# Patient Record
Sex: MALE | Race: WHITE | NOT HISPANIC OR LATINO | URBAN - METROPOLITAN AREA
[De-identification: names, ages, dates, MRNs, and addresses within clinical notes are randomized per-mention and may not be internally consistent; named-entity substitution may affect disease eponyms.]

---

## 2021-12-26 ENCOUNTER — APPOINTMENT (OUTPATIENT)
Dept: RADIOLOGY | Facility: CLINIC | Age: 26
End: 2021-12-26
Payer: COMMERCIAL

## 2021-12-26 ENCOUNTER — OFFICE VISIT (OUTPATIENT)
Dept: URGENT CARE | Facility: CLINIC | Age: 26
End: 2021-12-26
Payer: COMMERCIAL

## 2021-12-26 VITALS
BODY MASS INDEX: 41.75 KG/M2 | DIASTOLIC BLOOD PRESSURE: 111 MMHG | HEIGHT: 73 IN | HEART RATE: 99 BPM | WEIGHT: 315 LBS | RESPIRATION RATE: 20 BRPM | SYSTOLIC BLOOD PRESSURE: 176 MMHG | OXYGEN SATURATION: 100 %

## 2021-12-26 DIAGNOSIS — G89.29 WRIST PAIN, CHRONIC, RIGHT: ICD-10-CM

## 2021-12-26 DIAGNOSIS — M25.531 WRIST PAIN, CHRONIC, RIGHT: ICD-10-CM

## 2021-12-26 DIAGNOSIS — S69.92XA INJURY OF LEFT WRIST, INITIAL ENCOUNTER: Primary | ICD-10-CM

## 2021-12-26 PROCEDURE — 99213 OFFICE O/P EST LOW 20 MIN: CPT | Performed by: PHYSICIAN ASSISTANT

## 2021-12-26 PROCEDURE — 73110 X-RAY EXAM OF WRIST: CPT

## 2022-10-14 ENCOUNTER — OFFICE VISIT (OUTPATIENT)
Dept: URGENT CARE | Facility: CLINIC | Age: 27
End: 2022-10-14
Payer: COMMERCIAL

## 2022-10-14 VITALS — RESPIRATION RATE: 16 BRPM | HEART RATE: 113 BPM | OXYGEN SATURATION: 99 % | TEMPERATURE: 97.6 F

## 2022-10-14 DIAGNOSIS — J06.9 ACUTE URI: Primary | ICD-10-CM

## 2022-10-14 PROCEDURE — 99213 OFFICE O/P EST LOW 20 MIN: CPT | Performed by: NURSE PRACTITIONER

## 2022-10-14 RX ORDER — LOSARTAN POTASSIUM 50 MG/1
TABLET ORAL
COMMUNITY
Start: 2022-10-12

## 2022-10-14 RX ORDER — FEXOFENADINE HCL 180 MG/1
180 TABLET ORAL DAILY
COMMUNITY

## 2022-10-14 RX ORDER — PEN NEEDLE, DIABETIC 32GX 5/32"
NEEDLE, DISPOSABLE MISCELLANEOUS
COMMUNITY
Start: 2022-07-26

## 2022-10-14 RX ORDER — LIRAGLUTIDE 6 MG/ML
INJECTION, SOLUTION SUBCUTANEOUS
COMMUNITY
Start: 2022-10-04

## 2022-10-14 NOTE — PATIENT INSTRUCTIONS
Continue Allegra daily  Flonase daily as directed  Robitussin, delsym or Mucinex for cough  Increase fluid intake   Tylenol/Motrin as needed for pain or fever  Rest  Follow up with your PCP for worsening symptoms      Upper Respiratory Infection   WHAT YOU NEED TO KNOW:   An upper respiratory infection is also called a cold  It can affect your nose, throat, ears, and sinuses  Cold symptoms are usually worst for the first 3 to 5 days  Most people get better in 7 to 14 days  You may continue to cough for 2 to 3 weeks  Colds are caused by viruses and do not get better with antibiotics  DISCHARGE INSTRUCTIONS:   Call your local emergency number (911 in the [address] [place] [address],[address]) if:   You have chest pain or trouble breathing  Return to the emergency department if:   You have a fever over 102ºF (39ºC)  Call your doctor if:   You have a low fever  Your sore throat gets worse or you see white or yellow spots in your throat  Your symptoms get worse after 3 to 5 days or are not better in 14 days  You have a rash anywhere on your skin  You have large, tender lumps in your neck  You have thick, green, or yellow drainage from your nose  You cough up thick yellow, green, or bloody mucus  You have a bad earache  You have questions or concerns about your condition or care  Medicines: You may need any of the following:  Decongestants  help reduce nasal congestion and help you breathe more easily  If you take decongestant pills, they may make you feel restless or cause problems with your sleep  Do not use decongestant sprays for more than a few days  Cough suppressants  help reduce coughing  Ask your healthcare provider which type of cough medicine is best for you  NSAIDs , such as ibuprofen, help decrease swelling, pain, and fever  NSAIDs can cause stomach bleeding or kidney problems in certain people  If you take blood thinner medicine, always ask your healthcare provider if NSAIDs are safe for you  Always read the medicine label and follow directions  Acetaminophen  decreases pain and fever  It is available without a doctor's order  Ask how much to take and how often to take it  Follow directions  Read the labels of all other medicines you are using to see if they also contain acetaminophen, or ask your doctor or pharmacist  Acetaminophen can cause liver damage if not taken correctly  Do not use more than 4 grams (4,000 milligrams) total of acetaminophen in one day  Take your medicine as directed  Contact your healthcare provider if you think your medicine is not helping or if you have side effects  Tell him or her if you are allergic to any medicine  Keep a list of the medicines, vitamins, and herbs you take  Include the amounts, and when and why you take them  Bring the list or the pill bottles to follow-up visits  Carry your medicine list with you in case of an emergency  Self-care:   Rest as much as possible  Slowly start to do more each day  Drink more liquids as directed  Liquids will help thin and loosen mucus so you can cough it up  Liquids will also help prevent dehydration  Liquids that help prevent dehydration include water, fruit juice, and broth  Do not drink liquids that contain caffeine  Caffeine can increase your risk for dehydration  Ask your healthcare provider how much liquid to drink each day  Soothe a sore throat  Gargle with warm salt water  Make salt water by dissolving ¼ teaspoon salt in 1 cup warm water  You may also suck on hard candy or throat lozenges  You may use a sore throat spray  Use a humidifier or vaporizer  Use a cool mist humidifier or a vaporizer to increase air moisture in your home  This may make it easier for you to breathe and help decrease your cough  Use saline nasal drops as directed  These help relieve congestion  Apply petroleum-based jelly around the outside of your nostrils    This can decrease irritation from blowing your nose     Do not smoke  Nicotine and other chemicals in cigarettes and cigars can make your symptoms worse  They can also cause infections such as bronchitis or pneumonia  Ask your healthcare provider for information if you currently smoke and need help to quit  E-cigarettes or smokeless tobacco still contain nicotine  Talk to your healthcare provider before you use these products  Prevent a cold: Wash your hands often  Use soap and water every time you wash your hands  Rub your soapy hands together, lacing your fingers  Use the fingers of one hand to scrub under the nails of the other hand  Wash for at least 20 seconds  Rinse with warm, running water for several seconds  Then dry your hands  Use germ-killing gel if soap and water are not available  Do not touch your eyes or mouth without washing your hands first          Cover a sneeze or cough  Use a tissue that covers your mouth and nose  Put the used tissue in the trash right away  Use the bend of your arm if a tissue is not available  Wash your hands well with soap and water or use a hand   Do not stand close to anyone who is sneezing or coughing  Try to stay away from others while you are sick  This is especially important during the first 2 to 3 days when the virus is more easily spread  Wait until a fever, cough, or other symptoms are gone before you return to work or other regular activities  Do not share items while you are sick  This includes food, drinks, eating utensils, and dishes  Follow up with your doctor as directed:  Write down your questions so you remember to ask them during your visits  © Copyright [employer] 2022 Information is for End User's use only and may not be sold, redistributed or otherwise used for commercial purposes  All illustrations and images included in CareNotes® are the copyrighted property of [employer]  or Patricia Toussaint  The above information is an  only   It is not intended as medical advice for individual conditions or treatments  Talk to your doctor, nurse or pharmacist before following any medical regimen to see if it is safe and effective for you

## 2022-10-14 NOTE — PROGRESS NOTES
[facility]        NAME: Wilda Moy is a 32 y o  male  : 1995    MRN: [id number]  DATE: 2022  TIME: 11:51 AM    Assessment and Plan   Acute URI [J06 9]  1  Acute URI           Patient Instructions   Continue Allegra daily  Flonase daily as directed  Robitussin, delsym or Mucinex for cough  Increase fluid intake   Tylenol/Motrin as needed for pain or fever  Rest  Follow up with your PCP for worsening symptoms    Follow up with PCP in 3-5 days  Proceed to  ER if symptoms worsen  Chief Complaint     Chief Complaint   Patient presents with   • Cold Like Symptoms     Started 2 days ago  Sore throat ear pain, cough         History of Present Illness       Patient is a 32year old male presenting with 2 days of sore throat, cough, and bilateral ear pressure  Cough is productive with brown to yellow mucous  Denies fever or chills  He is a non smoker  He had 2 negative home covid tests  Reports chronic environmental allergies  He had been taking dayquil, nyquil, and allerga  Review of Systems   Review of Systems   Constitutional: Negative for activity change, chills and fever  HENT: Positive for ear pain (b/l ear pressure), postnasal drip and sore throat  Negative for congestion  Respiratory: Positive for cough  Skin: Negative for rash           Current Medications       Current Outpatient Medications:   •  [employer] Unifine Pentips Plus 32G X 4 MM MISC, , Disp: , Rfl:   •  fexofenadine (ALLEGRA) 180 MG tablet, Take 180 mg by mouth daily, Disp: , Rfl:   •  losartan (COZAAR) 50 mg tablet, , Disp: , Rfl:   •  Saxenda injection, , Disp: , Rfl:     Current Allergies     Allergies as of 10/14/2022   • (No Known Allergies)            The following portions of the patient's history were reviewed and updated as appropriate: allergies, current medications, past family history, past medical history, past social history, past surgical history and problem list      No past medical history on file  Past Surgical History:   Procedure Laterality Date   • ADENOIDECTOMY     • TONSILLECTOMY         Family History   Problem Relation Age of Onset   • Bipolar disorder Mother    • Prostate cancer Father          Medications have been verified  Objective   Pulse (!) 113   Temp 97 6 °F (36 4 °C) (Temporal)   Resp 16   SpO2 99%     Physical Exam     Physical Exam  Vitals reviewed  Constitutional:       General: He is awake  He is not in acute distress  Appearance: Normal appearance  HENT:      Head: Normocephalic  Right Ear: Hearing, ear canal and external ear normal  A middle ear effusion is present  Left Ear: Hearing, ear canal and external ear normal  A middle ear effusion is present  Nose: Nose normal       Mouth/Throat:      Lips: Pink  Pharynx: Oropharynx is clear  Cardiovascular:      Rate and Rhythm: Regular rhythm  Tachycardia present  Heart sounds: Normal heart sounds, S1 normal and S2 normal    Pulmonary:      Effort: Pulmonary effort is normal       Breath sounds: Normal breath sounds  No decreased breath sounds, wheezing, rhonchi or rales  Skin:     General: Skin is warm and moist    Neurological:      General: No focal deficit present  Mental Status: He is alert and oriented to person, place, and time  Psychiatric:         Behavior: Behavior is cooperative

## 2022-12-19 ENCOUNTER — OFFICE VISIT (OUTPATIENT)
Dept: URGENT CARE | Facility: CLINIC | Age: 27
End: 2022-12-19

## 2022-12-19 VITALS
SYSTOLIC BLOOD PRESSURE: 145 MMHG | DIASTOLIC BLOOD PRESSURE: 70 MMHG | TEMPERATURE: 97.5 F | HEART RATE: 121 BPM | RESPIRATION RATE: 24 BRPM | OXYGEN SATURATION: 98 %

## 2022-12-19 DIAGNOSIS — R07.89 CHEST WALL PAIN: Primary | ICD-10-CM

## 2022-12-19 NOTE — PROGRESS NOTES
[facility]        NAME: Chuy Lyon is a 32 y o  male  : 1995    MRN: [id number]  DATE: 2022  TIME: 9:26 AM    Assessment and Plan   Chest wall pain [R07 89]  1  Chest wall pain  ECG 12 lead            Patient Instructions        Follow up with PCP in 3-5 days  Proceed to  ER if symptoms worsen  Chief Complaint     Chief Complaint   Patient presents with   • Chest Pain     States he started with chest pain last night on L side  No pain with shallow breathing, only with deep breaths  Also no has dry cough  Took Pepto Bismol         History of Present Illness       Patient is a 32year old male presenting with cough that started last night  He had some blood tinged sputum after coughing  He also reports left upper chest wall discomfort that is reproducible with palpation and movement  He had a history of frequent GERD  He took Pepto tablet last night with improvement  Denies fever, chills, myalgias, abdominal pain, sore throat, or ear pain  Review of Systems   Review of Systems   Constitutional: Negative for activity change, chills, fatigue and fever  HENT: Negative for congestion, ear pain and sore throat  Respiratory: Positive for cough  Negative for shortness of breath  Cardiovascular: Positive for chest pain  Gastrointestinal: Positive for nausea  Negative for abdominal pain and vomiting  Neurological: Negative for headaches           Current Medications       Current Outpatient Medications:   •  [employer]One Unifine Pentips Plus 32G X 4 MM MISC, , Disp: , Rfl:   •  fexofenadine (ALLEGRA) 180 MG tablet, Take 180 mg by mouth daily, Disp: , Rfl:   •  losartan (COZAAR) 50 mg tablet, , Disp: , Rfl:   •  Saxenda injection, , Disp: , Rfl:     Current Allergies     Allergies as of 2022   • (No Known Allergies)            The following portions of the patient's history were reviewed and updated as appropriate: allergies, current medications, past family history, past medical history, past social history, past surgical history and problem list      No past medical history on file  Past Surgical History:   Procedure Laterality Date   • ADENOIDECTOMY     • TONSILLECTOMY         Family History   Problem Relation Age of Onset   • Bipolar disorder Mother    • Prostate cancer Father          Medications have been verified  Objective   /70   Pulse (!) 121   Temp 97 5 °F (36 4 °C) (Temporal)   Resp (!) 24   SpO2 98%        Physical Exam     Physical Exam  Vitals reviewed  Constitutional:       General: He is awake  He is not in acute distress  Appearance: He is well-developed  He is obese  HENT:      Head: Normocephalic  Right Ear: Hearing normal       Left Ear: Hearing normal       Nose: Nose normal    Cardiovascular:      Rate and Rhythm: Regular rhythm  Tachycardia present  Heart sounds: Normal heart sounds, S1 normal and S2 normal    Pulmonary:      Effort: Pulmonary effort is normal       Breath sounds: Normal breath sounds  No decreased breath sounds, wheezing, rhonchi or rales  Chest:      Chest wall: Tenderness present  Skin:     General: Skin is warm and moist    Neurological:      General: No focal deficit present  Mental Status: He is alert and oriented to person, place, and time  Psychiatric:         Behavior: Behavior is cooperative          EK  Rate 118  Sinus tachycardia  Normal OR, QRS, and ST segments

## 2022-12-29 LAB
ATRIAL RATE: 118 BPM
P AXIS: 16 DEGREES
PR INTERVAL: 124 MS
QRS AXIS: 43 DEGREES
QRSD INTERVAL: 90 MS
QT INTERVAL: 320 MS
QTC INTERVAL: 448 MS
T WAVE AXIS: 28 DEGREES
VENTRICULAR RATE: 118 BPM

## 2023-05-07 ENCOUNTER — OFFICE VISIT (OUTPATIENT)
Dept: URGENT CARE | Facility: CLINIC | Age: 28
End: 2023-05-07

## 2023-05-07 VITALS
HEART RATE: 96 BPM | TEMPERATURE: 97.6 F | BODY MASS INDEX: 42.66 KG/M2 | RESPIRATION RATE: 16 BRPM | WEIGHT: 315 LBS | OXYGEN SATURATION: 97 % | HEIGHT: 72 IN

## 2023-05-07 DIAGNOSIS — K14.6 SORENESS OF TONGUE: ICD-10-CM

## 2023-05-07 DIAGNOSIS — J02.9 SORE THROAT: Primary | ICD-10-CM

## 2023-05-07 DIAGNOSIS — K12.0 APHTHOUS STOMATITIS: ICD-10-CM

## 2023-05-07 LAB — S PYO AG THROAT QL: NEGATIVE

## 2023-05-07 RX ORDER — TRIAMCINOLONE ACETONIDE 0.1 %
1 PASTE (GRAM) DENTAL 2 TIMES DAILY
Qty: 5 G | Refills: 0 | Status: SHIPPED | OUTPATIENT
Start: 2023-05-07

## 2023-05-07 NOTE — PATIENT INSTRUCTIONS
--Rapid strep test negative  --Warm water and baking soda rinses three times a day  --Rx oral paste applied sparingly to affected area  --Follow-up with dentist and/or PCP for ongoing/worsening symptoms over the next 3-7 days

## 2023-05-07 NOTE — PROGRESS NOTES
[facility]        NAME: Lisseth Douglas is a 25 y o  male  : 1995    MRN: [id number]  DATE: May 7, 2023  TIME: 10:37 AM    Assessment and Plan   Sore throat [J02 9]  1  Sore throat  POCT rapid strepA      2  Soreness of tongue  triamcinolone (KENALOG) 0 1 % oral topical paste      3  Aphthous stomatitis              Patient Instructions     --Rapid strep test negative  --Warm water and baking soda rinses three times a day  --Rx oral paste applied sparingly to affected area  --Follow-up with dentist and/or PCP for ongoing/worsening symptoms over the next 3-7 days  Chief Complaint     Chief Complaint   Patient presents with   • Dental Pain     Took Amoxicillin- tooth extraction from L side bottom, tongue/mouth is sore  Occurred on Thursday the           History of Present Illness       Here with complaints of sore area on left underside of tongue, possible sore throat x 3 days  Had left lower molar extracted 10 days ago  Completed one week of antibiotic (amoxicillin TID) 3 days ago  Pain in tooth/socket resolved 2-3 days after procedure and has not returned since  No fever, nasal congestion, cough, headache  Review of Systems   Review of Systems   Constitutional: Negative for fever  HENT: Positive for mouth sores and sore throat  Negative for rhinorrhea  Respiratory: Negative for cough  Neurological: Negative for headaches  Current Medications       Current Outpatient Medications:   •  [employer] Unifine Pentips Plus 32G X 4 MM MISC, , Disp: , Rfl:   •  fexofenadine (ALLEGRA) 180 MG tablet, Take 180 mg by mouth daily, Disp: , Rfl:   •  losartan (COZAAR) 50 mg tablet, , Disp: , Rfl:   •  triamcinolone (KENALOG) 0 1 % oral topical paste, Apply 1 application   topically 2 (two) times a day, Disp: 5 g, Rfl: 0  •  Saxenda injection, , Disp: , Rfl:     Current Allergies     Allergies as of 2023   • (No Known Allergies)            The following portions of the patient's history were reviewed and updated as appropriate: allergies, current medications, past family history, past medical history, past social history, past surgical history and problem list      No past medical history on file  Past Surgical History:   Procedure Laterality Date   • ADENOIDECTOMY     • TONSILLECTOMY         Family History   Problem Relation Age of Onset   • Bipolar disorder Mother    • Prostate cancer Father          Medications have been verified  Objective   Pulse 96   Temp 97 6 °F (36 4 °C)   Resp 16   Ht 6' (1 829 m)   Wt (!) 150 kg (330 lb)   SpO2 97%   BMI 44 76 kg/m²   No LMP for male patient  Physical Exam     Physical Exam  HENT:      Nose: No congestion or rhinorrhea  Mouth/Throat:      Comments: Tonsils 1+, minimally erythematous, without exudate  Area of primary symptomatology, underside of left lateral tongue with 1 5 cm area of mild erythema, mild swelling, tenderness, and two small (1-2 mm), white, shallow erosions  Remainder of tongue nontender, non-swollen with normal appearance  Socket of removed left lower molar, and surrounding gums, nontender with normal appearance  Buccal mucosa and remainder of oropharynx with normal appearance  Lymphadenopathy:      Cervical: No cervical adenopathy  Neurological:      Mental Status: He is alert     Psychiatric:         Mood and Affect: Mood normal

## 2024-10-31 ENCOUNTER — OFFICE VISIT (OUTPATIENT)
Dept: URGENT CARE | Facility: CLINIC | Age: 29
End: 2024-10-31
Payer: COMMERCIAL

## 2024-10-31 VITALS
WEIGHT: 315 LBS | TEMPERATURE: 98.7 F | HEIGHT: 72 IN | DIASTOLIC BLOOD PRESSURE: 70 MMHG | HEART RATE: 97 BPM | SYSTOLIC BLOOD PRESSURE: 134 MMHG | BODY MASS INDEX: 42.66 KG/M2 | OXYGEN SATURATION: 98 % | RESPIRATION RATE: 15 BRPM

## 2024-10-31 DIAGNOSIS — R07.9 CHEST PAIN, UNSPECIFIED TYPE: ICD-10-CM

## 2024-10-31 DIAGNOSIS — R00.2 PALPITATIONS: Primary | ICD-10-CM

## 2024-10-31 PROCEDURE — G0382 LEV 3 HOSP TYPE B ED VISIT: HCPCS | Performed by: NURSE PRACTITIONER

## 2024-10-31 PROCEDURE — 93005 ELECTROCARDIOGRAM TRACING: CPT | Performed by: NURSE PRACTITIONER

## 2024-10-31 PROCEDURE — S9083 URGENT CARE CENTER GLOBAL: HCPCS | Performed by: NURSE PRACTITIONER

## 2024-10-31 RX ORDER — FLUTICASONE FUROATE 27.5 UG/1
SPRAY, METERED NASAL
COMMUNITY

## 2024-10-31 RX ORDER — PHENTERMINE HYDROCHLORIDE 37.5 MG/1
37.5 TABLET ORAL
COMMUNITY
Start: 2024-10-18 | End: 2024-11-17

## 2024-10-31 RX ORDER — PANTOPRAZOLE SODIUM 20 MG/1
20 TABLET, DELAYED RELEASE ORAL DAILY
COMMUNITY
Start: 2024-10-07

## 2024-10-31 NOTE — PATIENT INSTRUCTIONS
--EKG with high normal heart rate, otherwise without findings.   --Most likely cause of symptoms is side effect from phentermine.  Advise stopping for now and consulting with PCP and/or cardiologist prior to restarting  --Relaxation measures, avoidance of caffeine  --Go to ER if no improvement/worsening symptoms over the next 24-48 hours, particularly if accompanied by shortness of breath, nausea, sweating

## 2024-10-31 NOTE — PROGRESS NOTES
[email or web address]  [facility]        NAME: Brandyn Lord is a 29 y.o. male  : 1995    MRN: [id number]  DATE: 2024  TIME: 5:43 PM    Assessment and Plan   Palpitations [R00.2]  1. Palpitations  POCT ECG      2. Chest pain, unspecified type  POCT ECG        --Normal exam, largely normal EKG at this time, no red flags.  Symptoms most likely the result of AE from phentermine +/- anxiety.  Address per below.      Patient Instructions     Patient Instructions   --EKG with high normal heart rate, otherwise without findings.   --Most likely cause of symptoms is side effect from phentermine.  Advise stopping for now and consulting with PCP and/or cardiologist prior to restarting  --Relaxation measures, avoidance of caffeine, avoidance of overexertion for now  --Go to ER if no improvement/worsening symptoms over the next 24-48 hours, particularly if accompanied by shortness of breath, nausea, sweating     If tests have been performed at [facility], our office will contact you with results if changes need to be made to the care plan discussed with you at the visit.  [facility]    Chief Complaint     Chief Complaint   Patient presents with    Chest Pain     Pt reports feeling anxiety after taking diet pill given by his MD. He stated that he is not sure if he was just having anxiety or palpitations.          History of Present Illness       Here with complaints of intermittent palpitations, CP x 2 days.   Palpitations described as \"fluttering\" sensation, lasting seconds, a couple times a day.    Associated dizziness and brief (seconds) chest pain.  Chest pain described as \"sharp\", mid-sternal to left sided with radiation to upper back but not down arms, to neck, abdomen, elsewhere.   No positional or exertional variation to symptoms.    No associated shortness of breath, cough, wheezing, nausea, diaphoresis, calf pain.    Denies symptoms at present.    Denies " acute anxiety/stress, feeling like panic attack.    Started phentermine (37.5 mg daily) 5 days ago.  Didn't take this morning.    Denies taking increased/extra dose.   2 cups coffee per day, occasional soda.  Denies energy drinks, other recent increase in caffeine.   Denies alcohol, recreation drug use.   Denies dietary supplements.   Denies PH/FH cardiac conditions, coagulopathies.         Review of Systems   Review of Systems   Constitutional:  Negative for fever.   Respiratory:  Negative for cough and shortness of breath.    Cardiovascular:  Positive for chest pain and palpitations.   Gastrointestinal:  Negative for abdominal pain, nausea and vomiting.   Neurological:  Positive for dizziness.         Current Medications       Current Outpatient Medications:     fexofenadine (ALLEGRA) 180 MG tablet, Take 180 mg by mouth daily, Disp: , Rfl:     fluticasone (Flonase Sensimist) 27.5 MCG/SPRAY nasal spray, , Disp: , Rfl:     losartan (COZAAR) 50 mg tablet, , Disp: , Rfl:     pantoprazole (PROTONIX) 20 mg tablet, Take 20 mg by mouth daily, Disp: , Rfl:     phentermine (ADIPEX-P) 37.5 MG tablet, Take 37.5 mg by mouth, Disp: , Rfl:     [employer] Unifine Pentips Plus 32G X 4 MM MISC, , Disp: , Rfl:     Saxenda injection, , Disp: , Rfl:     triamcinolone (KENALOG) 0.1 % oral topical paste, Apply 1 application. topically 2 (two) times a day (Patient not taking: Reported on 10/31/2024), Disp: 5 g, Rfl: 0    Current Allergies     Allergies as of 10/31/2024    (No Known Allergies)            The following portions of the patient's history were reviewed and updated as appropriate: allergies, current medications, past family history, past medical history, past social history, past surgical history and problem list.     Past Medical History:   Diagnosis Date    GERD (gastroesophageal reflux disease)     Hypertension        Past Surgical History:   Procedure Laterality Date    ADENOIDECTOMY      TONSILLECTOMY         Family History    Problem Relation Age of Onset    Bipolar disorder Mother     Prostate cancer Father          Medications have been verified.        Objective   /70   Pulse 97   Temp 98.7 °F (37.1 °C)   Resp 15   Ht 6' (1.829 m)   Wt (!) 146 kg (321 lb)   SpO2 98%   BMI 43.54 kg/m²   No LMP for male patient.       Physical Exam     Physical Exam  Constitutional:       General: He is not in acute distress.     Appearance: He is not ill-appearing, toxic-appearing or diaphoretic.   Neck:      Comments: No thyroidmegaly.   Cardiovascular:      Rate and Rhythm: Normal rate and regular rhythm.   Pulmonary:      Effort: Pulmonary effort is normal. No respiratory distress.      Breath sounds: Normal breath sounds. No stridor. No wheezing, rhonchi or rales.   Chest:      Chest wall: No tenderness.   Musculoskeletal:         General: No tenderness.      Cervical back: No tenderness.      Comments: No calf tenderness.    Neurological:      Mental Status: He is alert.      Deep Tendon Reflexes: Reflexes normal.   Psychiatric:         Mood and Affect: Mood normal.         Behavior: Behavior normal.         Thought Content: Thought content normal.         Judgment: Judgment normal.

## 2024-11-01 LAB
ATRIAL RATE: 90 BPM
P AXIS: 50 DEGREES
PR INTERVAL: 140 MS
QRS AXIS: 34 DEGREES
QRSD INTERVAL: 98 MS
QT INTERVAL: 348 MS
QTC INTERVAL: 425 MS
T WAVE AXIS: 49 DEGREES
VENTRICULAR RATE: 90 BPM

## 2024-11-01 PROCEDURE — 93010 ELECTROCARDIOGRAM REPORT: CPT | Performed by: INTERNAL MEDICINE

## 2025-06-04 ENCOUNTER — OFFICE VISIT (OUTPATIENT)
Dept: URGENT CARE | Facility: CLINIC | Age: 30
End: 2025-06-04
Payer: COMMERCIAL

## 2025-06-04 VITALS
HEART RATE: 107 BPM | RESPIRATION RATE: 16 BRPM | HEIGHT: 72 IN | SYSTOLIC BLOOD PRESSURE: 154 MMHG | BODY MASS INDEX: 43.54 KG/M2 | TEMPERATURE: 98.7 F | OXYGEN SATURATION: 98 % | DIASTOLIC BLOOD PRESSURE: 73 MMHG

## 2025-06-04 DIAGNOSIS — L03.032 PARONYCHIA OF GREAT TOE OF LEFT FOOT: Primary | ICD-10-CM

## 2025-06-04 PROCEDURE — S9083 URGENT CARE CENTER GLOBAL: HCPCS | Performed by: NURSE PRACTITIONER

## 2025-06-04 PROCEDURE — G0382 LEV 3 HOSP TYPE B ED VISIT: HCPCS | Performed by: NURSE PRACTITIONER

## 2025-06-04 RX ORDER — CEPHALEXIN 500 MG/1
500 CAPSULE ORAL 3 TIMES DAILY
Qty: 21 CAPSULE | Refills: 0 | Status: SHIPPED | OUTPATIENT
Start: 2025-06-04 | End: 2025-06-11

## 2025-06-04 NOTE — PROGRESS NOTES
[facility]        NAME: Brandyn Lord is a 30 y.o. male  : 1995    MRN: [id number]  DATE: 2025  TIME: 1:54 PM    Assessment and Plan   Paronychia of great toe of left foot [L03.032]  1. Paronychia of great toe of left foot  cephalexin (KEFLEX) 500 mg capsule            Patient Instructions       Keep clean and dry  Take med as prescribed   Follow up with PCP in 3-5 days.  Proceed to  ER if symptoms worsen.    If tests have been performed at [facility], our office will contact you with results if changes need to be made to the care plan discussed with you at the visit.  [facility]    Chief Complaint     Chief Complaint   Patient presents with    Ingrown Toenail     Left foot- pt reports drainage and redness. Pt reports he did cut it.          History of Present Illness       HPI  Presents to clinic with complaint of redness and drainage of the left great toe.  States he cut the toenail and then symptoms started.  Duration a couple of days now.     Review of Systems   Review of Systems   Constitutional:  Negative for fever.   Musculoskeletal:  Positive for arthralgias (left great toe). Negative for joint swelling.   Skin:  Positive for color change (red) and wound.         Current Medications     Current Medications[1]    Current Allergies     Allergies as of 2025    (No Known Allergies)            The following portions of the patient's history were reviewed and updated as appropriate: allergies, current medications, past family history, past medical history, past social history, past surgical history and problem list.     Past Medical History[2]    Past Surgical History[3]    Family History[4]      Medications have been verified.        Objective   /73   Pulse (!) 107   Temp 98.7 °F (37.1 °C)   Resp 16   Ht 6' (1.829 m)   SpO2 98%   BMI 43.54 kg/m²   No LMP for male patient.       Physical Exam     Physical  Exam    Musculoskeletal:         General: Swelling (There is a piece of bandAid over the left great toe. Removed and noticed the left great toe, mainly around the edge of the left great toenail is swollen. Mild wetness.) and tenderness (with palpation) present.      Comments: The toe is cleaned with betadine and covered 2 X 2 gauze  Wrapped with Kelex  Advised to leave it open to air when at home     Skin:     Capillary Refill: Capillary refill takes less than 2 seconds.      Findings: Erythema (minimal to mild) present.                        [1]   Current Outpatient Medications:     cephalexin (KEFLEX) 500 mg capsule, Take 1 capsule (500 mg total) by mouth 3 (three) times a day for 7 days, Disp: 21 capsule, Rfl: 0    [employer] Unifine Pentips Plus 32G X 4 MM MISC, , Disp: , Rfl:     fexofenadine (ALLEGRA) 180 MG tablet, Take 180 mg by mouth daily, Disp: , Rfl:     fluticasone (Flonase Sensimist) 27.5 MCG/SPRAY nasal spray, , Disp: , Rfl:     losartan (COZAAR) 50 mg tablet, , Disp: , Rfl:     pantoprazole (PROTONIX) 20 mg tablet, Take 20 mg by mouth daily, Disp: , Rfl:     phentermine (ADIPEX-P) 37.5 MG tablet, Take 37.5 mg by mouth, Disp: , Rfl:     Saxenda injection, , Disp: , Rfl:     triamcinolone (KENALOG) 0.1 % oral topical paste, Apply 1 application. topically 2 (two) times a day (Patient not taking: Reported on 10/31/2024), Disp: 5 g, Rfl: 0  [2]   Past Medical History:  Diagnosis Date    GERD (gastroesophageal reflux disease)     Hypertension    [3]   Past Surgical History:  Procedure Laterality Date    ADENOIDECTOMY      TONSILLECTOMY     [4]   Family History  Problem Relation Name Age of Onset    Bipolar disorder Mother      Prostate cancer Father